# Patient Record
Sex: MALE | Race: ASIAN | NOT HISPANIC OR LATINO | Employment: FULL TIME | URBAN - METROPOLITAN AREA
[De-identification: names, ages, dates, MRNs, and addresses within clinical notes are randomized per-mention and may not be internally consistent; named-entity substitution may affect disease eponyms.]

---

## 2024-01-02 NOTE — PROGRESS NOTES
Subjective   HPI: Shira Carrillo is a 33 y.o. male is a new patient here for a full body skin examination.  Patient has a concern for a changing mole on the left lateral lower leg.  Present for about a year however over the last 6 months it has become pruritic and has grown in size.  No personal or family history of skin cancer.    ROS: No other skin or systemic complaints other than what is documented elsewhere in the note.    ALLERGIES: Patient has no allergy information on record.    SOCIAL:  has no history on file for tobacco use, alcohol use, and drug use.    Objective   A chaperone was present during the entirety of the examination.    Well appearing patient in no apparent distress; mood and affect are within normal limits.    A full examination was performed including scalp, head, eyes, ears, nose, lips, mouth, tongue, neck, chest, axillae, abdomen, back, buttocks, bilateral upper extremities, bilateral lower extremities, hands, feet, fingers, toes, fingernails, and toenails. Cervical, supraclavicular, axillary and inguinal lymph nodes were palpated. All findings within normal limits unless otherwise noted below.    Numerous benign appearing symmetrical, regular boarders, evenly pigmented, nevi    Left Lower Leg - Anterior  0.2 mm dark brown macule with surrounding light brown pigment    Scalp  Symmetric erythematous patches overlying greasy scales.      Left Lower Leg - Anterior, Right Lower Leg - Anterior  Significant xerosis and scaling of ble and feet    Left Abdomen (side) - Lower  Firm pink-brown papule that dimples with lateral pressure.        Assessment/Plan   1. Atypical nevus of left lower leg  Left Lower Leg - Anterior    Shave removal - Left Lower Leg - Anterior    Specimen 1 - Dermatopathology- DERM LAB  Differential Diagnosis: nad  Check Margins Yes/No?:    Comments:    Dermpath Lab: Routine Histopathology (formalin-fixed tissue)    2. Seborrheic dermatitis  Scalp    Start:  Ciclopirox  shampoo  Fluocinonide solution    Discussed nature of seborrheic dermatitis with patient.  I recommended starting treatment with ciclopirox shampoo and fluocinonide solution.  Discussed the importance of washing hair every day.  Patient verbalizes understanding and opts for treatment today.      Related Medications  ciclopirox 1 % shampoo  Wet hair and apply shampoo to scalp. Lather and leave on for 3 minutes. Rinse. Do this twice a week at night.    fluocinonide (Lidex) 0.05 % external solution  Apply topically 2 times a day. Apply topically to scalp and behind ears BID.    3. Ichthyosis vulgaris (2)  Left Lower Leg - Anterior; Right Lower Leg - Anterior    Recommended amlactin 12%    Related Medications  ammonium lactate (Amlactin) 12 % cream  Apply topically bid to legs and feet.    4. Multiple benign nevi    Discussed the need for annual body examination. The patient was advised to practice sun protection and sun avoidance, use daily sunscreen, and perform regular self skin exams.  Sun protection was discussed, including avoiding the mid-day sun, wearing a sunscreen with SPF at least 60, and stressing the need for reapplication of sunscreen and applying more than they think they need.     Discussed the ABCDEs of melanoma and recommended patient observe moles for any changes.  Patient verbalizes understanding.    5. Dermatofibroma  Left Abdomen (side) - Lower    Observation.          FOLLOW UP: 1 year fbse - I will call pt with results    The patient was encouraged to contact me with any further questions or concerns.  Kat Ma PA-C  1/23/2024

## 2024-01-23 ENCOUNTER — OFFICE VISIT (OUTPATIENT)
Dept: DERMATOLOGY | Facility: CLINIC | Age: 34
End: 2024-01-23
Payer: COMMERCIAL

## 2024-01-23 DIAGNOSIS — Q80.0 ICHTHYOSIS VULGARIS: ICD-10-CM

## 2024-01-23 DIAGNOSIS — D22.72 ATYPICAL NEVUS OF LEFT LOWER LEG: Primary | ICD-10-CM

## 2024-01-23 DIAGNOSIS — L21.9 SEBORRHEIC DERMATITIS: ICD-10-CM

## 2024-01-23 DIAGNOSIS — D23.9 DERMATOFIBROMA: ICD-10-CM

## 2024-01-23 DIAGNOSIS — D22.9 MULTIPLE BENIGN NEVI: ICD-10-CM

## 2024-01-23 PROCEDURE — 88305 TISSUE EXAM BY PATHOLOGIST: CPT | Performed by: DERMATOLOGY

## 2024-01-23 PROCEDURE — 11300 SHAVE SKIN LESION 0.5 CM/<: CPT

## 2024-01-23 PROCEDURE — 99204 OFFICE O/P NEW MOD 45 MIN: CPT

## 2024-01-23 RX ORDER — AMMONIUM LACTATE 12 G/100G
CREAM TOPICAL
Qty: 140 G | Refills: 2 | Status: SHIPPED | OUTPATIENT
Start: 2024-01-23

## 2024-01-23 RX ORDER — FLUOCINONIDE TOPICAL SOLUTION USP, 0.05% 0.5 MG/ML
SOLUTION TOPICAL 2 TIMES DAILY
Qty: 60 ML | Refills: 11 | Status: SHIPPED | OUTPATIENT
Start: 2024-01-23

## 2024-01-23 RX ORDER — CICLOPIROX 1 G/100ML
SHAMPOO TOPICAL
Qty: 120 ML | Refills: 11 | Status: SHIPPED | OUTPATIENT
Start: 2024-01-23

## 2024-01-26 LAB
LABORATORY COMMENT REPORT: NORMAL
PATH REPORT.FINAL DX SPEC: NORMAL
PATH REPORT.GROSS SPEC: NORMAL
PATH REPORT.MICROSCOPIC SPEC OTHER STN: NORMAL
PATH REPORT.RELEVANT HX SPEC: NORMAL
PATH REPORT.TOTAL CANCER: NORMAL

## 2024-06-04 ENCOUNTER — OFFICE VISIT (OUTPATIENT)
Dept: OTOLARYNGOLOGY | Facility: CLINIC | Age: 34
End: 2024-06-04
Payer: COMMERCIAL

## 2024-06-04 VITALS — HEIGHT: 70 IN | BODY MASS INDEX: 18.6 KG/M2 | TEMPERATURE: 96.6 F | WEIGHT: 129.9 LBS

## 2024-06-04 DIAGNOSIS — R04.2 BLOODY SPUTUM: ICD-10-CM

## 2024-06-04 DIAGNOSIS — J34.2 NASAL SEPTAL DEVIATION: Primary | ICD-10-CM

## 2024-06-04 DIAGNOSIS — R04.0 NASAL BLEEDING: ICD-10-CM

## 2024-06-04 DIAGNOSIS — R04.0 EPISTAXIS: ICD-10-CM

## 2024-06-04 PROCEDURE — 1036F TOBACCO NON-USER: CPT | Performed by: NURSE PRACTITIONER

## 2024-06-04 PROCEDURE — 31231 NASAL ENDOSCOPY DX: CPT | Performed by: NURSE PRACTITIONER

## 2024-06-04 PROCEDURE — 99204 OFFICE O/P NEW MOD 45 MIN: CPT | Performed by: NURSE PRACTITIONER

## 2024-06-04 RX ORDER — SODIUM CHLORIDE/ALOE VERA
2 SPRAY, NON-AEROSOL (ML) NASAL 3 TIMES DAILY
Qty: 5 ML | Refills: 3 | Status: SHIPPED | OUTPATIENT
Start: 2024-06-04 | End: 2024-07-04

## 2024-06-04 ASSESSMENT — PATIENT HEALTH QUESTIONNAIRE - PHQ9
1. LITTLE INTEREST OR PLEASURE IN DOING THINGS: NOT AT ALL
SUM OF ALL RESPONSES TO PHQ9 QUESTIONS 1 AND 2: 0
2. FEELING DOWN, DEPRESSED OR HOPELESS: NOT AT ALL

## 2024-06-04 NOTE — PROGRESS NOTES
Subjective   Patient ID: Shira Carrillo is a 33 y.o. male who presents for New Patient Visit (Blood in mucus when clearing throat in morning, been like this for month +).  HPI  Shira Carrillo  is a 33 y.o. old male who presents for evaluation of epistaxis. Patient notes that every morning in the last 2 months, he has had blood tinged drainage in his phlegm. He denies any anterior nasal drainage or bleeding. The patient has no history of epistaxis. Patient reports taking the following anticoagulants/antiplatelet agents: Denies.  He denies any difficulty swallowing.    He denies any chronic cough. Denies shortness of breath.     Patient denies hx of nasal surgery or trauma. No history of uncontrolled hypertension. Pt has no history of significant exposure to toxic fumes, nickel, or wood dust.  Patient denies intranasal steroid use, repetitive digital trauma, or intranasal drug use.     Patient denies any other sinonasal complaints.     Prior imaging:     Review of Systems  Review of systems is negative for constitutional, ophthalmological, cardiac, pulmonary, renal, gastrointestinal, musculoskeletal, mental health, endocrine, or neurologic disorders (except as listed in the HPI, [facility], and Problem List).     Objective   Physical Exam  CONSTITUTIONAL: Vital signs reviewed. Patient appears well developed and well nourished.   GENERAL: this is a healthy appearing male who appears stated age. The patient is alert and appropriately verbally conversant without hoarseness. This patient is in no apparent distress.   FACE: The face was inspected and no cutaneous masses or lesions were visualized. There was no erythema or edema noted. Facial movement was symmetric. No skin lesions were detected. There was no sinus tenderness elicited. TMJ crepitus absent.   EYES: Extra-ocular muscle function was intact. No nystagmus was observed. Pupils were equal.   CRANIAL NERVES: Cranial nerves II, III, IV, and VI were noted to be intact via  extra-ocular muscle movement testing. Cranial nerve VII noted to be intact and symmetric by facial movement. Cranial nerves IX and X noted to be intact by gag reflex and palatal movement. Cranial nerve XII noted to be intact by active and symmetric tongue movement.   NOSE: Examination of the nose revealed the nasal dorsum to be midline. Intranasal exam reveals the septum is deviated left with a spur on the right. The inferior turbinates were healthy. No masses, polyps, mucopus, or other lesion on anterior rhinoscopy. See below procedure note as applicable for further exam.  ORAL CAVITY: Examination of the oral cavity revealed no mass lesions nor infection. The palate was noted to be intact. The tongue exhibited normal mobility. Mucosa was moist without lesion. The lips were free of lesion. Gums were free of inflammation. Dentition: normal without obvious infection or inflammation  OROPHARYNX: The oral pharynx was free of mass lesion or mucosal abnormality. The palate was noted to be without lesion. The uvula was normal appearing. The tonsils were Normal.  EARS: Examination of the ears revealed that the auricles were normally formed with no lesions. The external auditory canals were normal. The tympanic membranes were intact.  There is no inflammation visualized.   NECK: Visualization and palpation of the neck revealed no mass lesions. No skin lesions or inflammatory processes were detected. The cervical musculature was normal to palpation.   CERVICAL LYMPHATICS: There were no palpable lymph nodes in the posterior triangle, submandibular triangle, jugulodigastric region, or central neck.  RESPIRATORY: Normal inspiration and expiration and chest wall expansion, no use of accessory muscles to breathe, no stridor.  NEUROLOGICAL: Patient is ambulatory without assist. Mentation is clear. Answering questions appropriately.     Nasal / sinus endoscopy    Date/Time: 6/4/2024 9:19 AM    Performed by: Agata Camarillo,  APRN-CNP  Authorized by: ROBERT Paredes    Consent:     Consent obtained:  Verbal    Consent given by:  Patient    Risks discussed:  Bleeding, infection and pain    Alternatives discussed:  No treatment, observation and delayed treatment  Procedure details:     Indications: assessment of airway and sino-nasal symptoms      Medication:  Afrin and Mckay-Synephrine 2%    Instrument: flexible fiberoptic nasal endoscope      Scope location: bilateral nare    Post-procedure details:     Patient tolerance of procedure:  Tolerated well, no immediate complications  Comments:      Findings: After topical decongestion with decongestant and anesthetic spray, nasal endoscopy was performed using an endoscope. The septum was deviated left with a spur on the right. The nasal cavity is hypervascular. The inferior turbinates were healthy.  The middle turbinates appeared healthy, the middle meatus is free of purulence, masses, lesions or polyps. The superior meatus and sphenoethmoid recess are clear bilaterally. The nasal passageway is patent. The nasopharynx was clear. There were no complications and the patient tolerated the procedure well.        Assessment/Plan     ASSESSMENT:   Shira Carrillo is a 33 y.o. year old male with minimal bloody sputum x 2 months. No obvious source of bleeding on scope exam. Rec. Use of ayr saline gel and humidifier. If not improving, would refer to GI for upper GI scope.     PLAN:   1. Nasal endoscopy: left dev, right spur, no prominent vessels or sources of bleeding.   2. I recommended the patient use a humidifier in the bedroom and in the house  3. Patient advised to use nasal saline gel at least 2 times per day, by aiming toward the septum, going forward after next follow up. Provided with a sample today.  4. Discussed that I do not see any apparent sources of bleeding. He does have some hypervascularity to his nasal tissue, but this is within normal range. If he is not improving with  above measures, would refer to gastroenterology for an upper GI scope.   5. Patient advised to follow-up in 8 weeks or sooner if needed to assess for benefit of current therapy. Patient agrees with established plan of care and all questions were answered.

## 2024-06-04 NOTE — PATIENT INSTRUCTIONS
Today you were evaluated by Agata Camarillo CNP.    Please follow-up in 8 weeks or sooner if needed. If you have any questions or concerns, please contact my office at [phone]. .    Start using Ayr saline gel at least 3 times per day. This is an over the counter medication. The easiest place to obtain this is online at [employer]. If you obtain this in a spray bottle, please spray by lifting the outside of the nostril and spray directly at the center of your nose. After spraying, gently sniff back and pinch your nose. If you get this in a tube, please place a dime size amount on the pad of your thumb, swipe into the nostril, sniff back, then pinch your nose. Do NOT use a Q-tip or fingertip for application. If you are using medicated nasal sprays (flonase, azelastine, etc.), please apply AFTER application.     NOSE CARE and NOSEBLEED PREVENTION  - Do not stick anything in your nose other than the medicine as noted below. DO NOT pick your nose as this will cause the bleeding  - Avoid any nose blowing, sneeze with your mouth open, avoid any maneuvers that increase the blood pressure in your head (such as straining on the toilet) and keep your head above your heart as much as possible.  - We recommended the patient use a humidifier in the bedroom and in the house.  - Start using nasal saline gel (Ayr nasal gel) at least 3 times per day. Alternatively, you can also use Vasaline three times daily. You can also use a saline nasal spray (Ocean nasal spray) 4-6 times daily. These are all over the counter medications    Please contact our scheduling and  service at [phone]. They will work with you to get your test, imaging, or other appointments scheduled that might have been ordered.

## 2024-07-11 ENCOUNTER — TELEPHONE (OUTPATIENT)
Dept: OTOLARYNGOLOGY | Facility: HOSPITAL | Age: 34
End: 2024-07-11
Payer: COMMERCIAL

## 2024-08-21 ENCOUNTER — APPOINTMENT (OUTPATIENT)
Dept: OTOLARYNGOLOGY | Facility: CLINIC | Age: 34
End: 2024-08-21
Payer: COMMERCIAL

## 2024-08-28 ENCOUNTER — APPOINTMENT (OUTPATIENT)
Dept: OTOLARYNGOLOGY | Facility: CLINIC | Age: 34
End: 2024-08-28
Payer: COMMERCIAL

## 2025-08-18 ENCOUNTER — CONSULT (OUTPATIENT)
Dept: ENDOCRINOLOGY | Facility: CLINIC | Age: 35
End: 2025-08-18
Payer: COMMERCIAL

## 2025-08-18 VITALS
BODY MASS INDEX: 20.07 KG/M2 | SYSTOLIC BLOOD PRESSURE: 110 MMHG | WEIGHT: 140.2 LBS | TEMPERATURE: 97.1 F | DIASTOLIC BLOOD PRESSURE: 71 MMHG | HEIGHT: 70 IN | OXYGEN SATURATION: 98 % | RESPIRATION RATE: 16 BRPM | HEART RATE: 63 BPM

## 2025-08-18 DIAGNOSIS — Z31.41 FERTILITY TESTING: Primary | ICD-10-CM

## 2025-08-18 PROCEDURE — 99212 OFFICE O/P EST SF 10 MIN: CPT

## 2025-08-18 PROCEDURE — 99202 OFFICE O/P NEW SF 15 MIN: CPT

## 2025-08-18 ASSESSMENT — COLUMBIA-SUICIDE SEVERITY RATING SCALE - C-SSRS
2. HAVE YOU ACTUALLY HAD ANY THOUGHTS OF KILLING YOURSELF?: NO
1. IN THE PAST MONTH, HAVE YOU WISHED YOU WERE DEAD OR WISHED YOU COULD GO TO SLEEP AND NOT WAKE UP?: NO
6. HAVE YOU EVER DONE ANYTHING, STARTED TO DO ANYTHING, OR PREPARED TO DO ANYTHING TO END YOUR LIFE?: NO

## 2025-08-18 ASSESSMENT — PATIENT HEALTH QUESTIONNAIRE - PHQ9
1. LITTLE INTEREST OR PLEASURE IN DOING THINGS: NOT AT ALL
2. FEELING DOWN, DEPRESSED OR HOPELESS: NOT AT ALL
SUM OF ALL RESPONSES TO PHQ9 QUESTIONS 1 AND 2: 0

## 2025-08-18 ASSESSMENT — PAIN SCALES - GENERAL: PAINLEVEL_OUTOF10: 0-NO PAIN

## 2025-09-02 DIAGNOSIS — Z13.71 SCREENING FOR GENETIC DISEASE CARRIER STATUS: Primary | ICD-10-CM
